# Patient Record
Sex: FEMALE | Race: WHITE | NOT HISPANIC OR LATINO | URBAN - NONMETROPOLITAN AREA
[De-identification: names, ages, dates, MRNs, and addresses within clinical notes are randomized per-mention and may not be internally consistent; named-entity substitution may affect disease eponyms.]

---

## 2018-12-26 NOTE — PATIENT DISCUSSION
(O46.2067) Primary open-angle glaucoma left eye indeterminate stage - Assesment : Examination revealed Primary Open Angle Glaucoma of indeterminate stage of the left eye. Patient has been without eye drops for several days and has elevated IOP in the left eye. Hx of tube shunt / bleb OU, cataract surgery, laser glaucoma surgery, and DSEK. - Plan : Begin Cosopt PF OU BID (samples given x2) and Zioptan PF (sample given) OU QHS (OK to take two drops of Gladys Amanda today to start). See Dr. Tao Rey next week after patient returns from [place]. Advised of condition and discussed with patient in depth. Monitor for changes. Advised patient to call our office when decreased vision or increased eye pain. RV if unable to follow up with Dr. Tao Rey next week.

## 2018-12-26 NOTE — PATIENT DISCUSSION
(H40.1111) Primary open-angle glaucoma right eye mild stage - Assesment : Examination revealed Primary Open Angle Glaucoma. IOP OD WNL. - Plan : Monitor for changes. Advised patient to call our office when decreased vision or increased eye pain.

## 2021-09-28 ENCOUNTER — IMPORTED ENCOUNTER (OUTPATIENT)
Dept: URBAN - NONMETROPOLITAN AREA CLINIC 1 | Facility: CLINIC | Age: 41
End: 2021-09-28

## 2021-09-28 PROBLEM — H52.223: Noted: 2021-09-28

## 2021-09-28 PROBLEM — H52.13: Noted: 2021-09-28

## 2021-09-28 PROCEDURE — 92004 COMPRE OPH EXAM NEW PT 1/>: CPT

## 2021-09-28 PROCEDURE — 92015 DETERMINE REFRACTIVE STATE: CPT

## 2022-04-09 ASSESSMENT — TONOMETRY
OS_IOP_MMHG: 14
OD_IOP_MMHG: 14

## 2022-04-09 ASSESSMENT — VISUAL ACUITY
OS_CC: J1+
OD_SC: 20/20-2
OS_SC: 20/20
OD_CC: J1+

## 2023-06-28 ENCOUNTER — ESTABLISHED PATIENT (OUTPATIENT)
Dept: RURAL CLINIC 1 | Facility: CLINIC | Age: 43
End: 2023-06-28

## 2023-06-28 DIAGNOSIS — H52.13: ICD-10-CM

## 2023-06-28 DIAGNOSIS — H52.223: ICD-10-CM

## 2023-06-28 PROCEDURE — 92310-E CONTACT LENS FITTING ESTABLISH PATIENT

## 2023-06-28 PROCEDURE — 92014 COMPRE OPH EXAM EST PT 1/>: CPT

## 2023-06-28 PROCEDURE — 92015 DETERMINE REFRACTIVE STATE: CPT

## 2023-06-28 ASSESSMENT — VISUAL ACUITY
OD_CC: 20/20
OS_CC: 20/20
OU_CC: 20/20
OS_CC: 20/20
OD_CC: 20/25
OU_CC: 20/20

## 2023-06-28 ASSESSMENT — TONOMETRY
OD_IOP_MMHG: 12
OS_IOP_MMHG: 12